# Patient Record
Sex: MALE | Race: WHITE | NOT HISPANIC OR LATINO | Employment: UNEMPLOYED | URBAN - NONMETROPOLITAN AREA
[De-identification: names, ages, dates, MRNs, and addresses within clinical notes are randomized per-mention and may not be internally consistent; named-entity substitution may affect disease eponyms.]

---

## 2017-03-24 ENCOUNTER — HISTORY (OUTPATIENT)
Dept: FAMILY MEDICINE | Facility: OTHER | Age: 42
End: 2017-03-24

## 2017-03-24 ENCOUNTER — HOSPITAL ENCOUNTER (OUTPATIENT)
Dept: RADIOLOGY | Facility: OTHER | Age: 42
End: 2017-03-24
Attending: NURSE PRACTITIONER

## 2017-03-24 ENCOUNTER — OFFICE VISIT - GICH (OUTPATIENT)
Dept: FAMILY MEDICINE | Facility: OTHER | Age: 42
End: 2017-03-24

## 2017-03-24 DIAGNOSIS — S39.011A STRAIN OF MUSCLE, FASCIA AND TENDON OF ABDOMEN, INITIAL ENCOUNTER: ICD-10-CM

## 2017-03-24 DIAGNOSIS — R10.32 LEFT LOWER QUADRANT PAIN: ICD-10-CM

## 2017-03-24 LAB
ABSOLUTE BASOPHILS - HISTORICAL: 0.1 THOU/CU MM
ABSOLUTE EOSINOPHILS - HISTORICAL: 0.3 THOU/CU MM
ABSOLUTE LYMPHOCYTES - HISTORICAL: 3 THOU/CU MM (ref 0.9–2.9)
ABSOLUTE MONOCYTES - HISTORICAL: 0.3 THOU/CU MM
ABSOLUTE NEUTROPHILS - HISTORICAL: 3.5 THOU/CU MM (ref 1.7–7)
ANION GAP - HISTORICAL: 6 (ref 5–18)
BASOPHILS # BLD AUTO: 1 %
BILIRUB UR QL: NEGATIVE
BUN SERPL-MCNC: 16 MG/DL (ref 7–25)
BUN/CREAT RATIO - HISTORICAL: 16
C-REACTIVE PROTEIN - HISTORICAL: <1 MG/DL
CALCIUM SERPL-MCNC: 9.2 MG/DL (ref 8.6–10.3)
CHLORIDE SERPLBLD-SCNC: 107 MMOL/L (ref 98–107)
CLARITY, URINE: CLEAR CLARITY
CO2 SERPL-SCNC: 26 MMOL/L (ref 21–31)
COLOR UR: YELLOW COLOR
CREAT SERPL-MCNC: 1 MG/DL (ref 0.7–1.3)
EOSINOPHIL NFR BLD AUTO: 4.5 %
ERYTHROCYTE [DISTWIDTH] IN BLOOD BY AUTOMATED COUNT: 11.7 % (ref 11.5–15.5)
GFR IF NOT AFRICAN AMERICAN - HISTORICAL: >60 ML/MIN/1.73M2
GLUCOSE SERPL-MCNC: 106 MG/DL (ref 70–105)
GLUCOSE URINE: NEGATIVE MG/DL
HCT VFR BLD AUTO: 48.3 % (ref 37–53)
HEMOGLOBIN: 16.5 G/DL (ref 13.5–17.5)
KETONES UR QL: NEGATIVE MG/DL
LEUKOCYTE ESTERASE URINE: NEGATIVE
LYMPHOCYTES NFR BLD AUTO: 41.6 % (ref 20–44)
MCH RBC QN AUTO: 29.4 PG (ref 26–34)
MCHC RBC AUTO-ENTMCNC: 34.1 G/DL (ref 32–36)
MCV RBC AUTO: 86 FL (ref 80–100)
MONOCYTES NFR BLD AUTO: 4.6 %
NEUTROPHILS NFR BLD AUTO: 48.2 % (ref 42–72)
NITRITE UR QL STRIP: NEGATIVE
OCCULT BLOOD,URINE - HISTORICAL: NEGATIVE
PH UR: 6 [PH]
PLATELET # BLD AUTO: 255 THOU/CU MM (ref 140–440)
PMV BLD: 8.1 FL (ref 6.5–11)
POTASSIUM SERPL-SCNC: 4.7 MMOL/L (ref 3.5–5.1)
PROTEIN QUALITATIVE,URINE - HISTORICAL: NEGATIVE MG/DL
RED BLOOD COUNT - HISTORICAL: 5.61 MIL/CU MM (ref 4.3–5.9)
SODIUM SERPL-SCNC: 139 MMOL/L (ref 133–143)
SP GR UR STRIP: 1.02
UROBILINOGEN,QUALITATIVE - HISTORICAL: NORMAL EU/DL
WHITE BLOOD COUNT - HISTORICAL: 7.2 THOU/CU MM (ref 4.5–11)

## 2018-01-04 NOTE — PATIENT INSTRUCTIONS
Patient Information     Patient Name MRN Brayan Schreiber [id number] Male 1975      Patient Instructions by Clari Benavides NP at 3/24/2017  9:00 AM     Author:  Clari Benavides NP Service:  (none) Author Type:  PHYS- Nurse Practitioner     Filed:  3/24/2017  9:49 AM Encounter Date:  3/24/2017 Status:  Signed     :  Clari Benavides NP (PHYS- Nurse Practitioner)            Abdominal Pain   ________________________________________________________________________  KEY POINTS    Abdominal pain is aching or cramping in your belly. The pain can be mild or intense, and sharp or dull.    Treatment may include medicine for gas and bloating, pain, or infection, or in some cases surgery.    Ask your provider how to take care of yourself at home, what symptoms to watch for, and what to do if you have them.  ________________________________________________________________________  What is abdominal pain?   Abdominal pain is aching or cramping in your belly. The abdomen, or belly, is the area between your chest and pelvis. The pain can range from mild discomfort to cramping or severe pain. It could also be sharp and stabbing or dull. It may feel like pressure or burning.  Abdominal pain is not normal, but often it may not be serious.  What is the cause?  Many things can cause abdominal pain, such as:    Ulcers, indigestion or heartburn    Infections, such as food poisoning or stomach flu    Food allergy or trouble digesting milk products (lactose intolerance) or gluten    Constipation, diarrhea, or gastritis (stomach irritation)    Hernia    Urinary tract infection or kidney stones    Muscle strain    Appendicitis  Women may have pain caused by menstruation, pregnancy, cramping of the uterus during childbirth or breast-feeding, or a disease or infection in the uterus  Sometimes abdominal pain is caused by a problem in another part of the body, such as the lungs or the heart. For example, a heart  attack can cause upper abdominal pain.  You cannot always tell where the problem is or how serious the problem is from the severity of the pain. Mild conditions such as gas or stomach flu may cause severe pain, while more serious problems such as appendicitis or cancer may cause only mild pain at first.  If your abdominal pain is severe or you also have any of the following symptoms, contact your healthcare provider as soon as possible:    Nausea, fever, or not able to keep food down for several days    Blood in your bowel movements    Trouble breathing    Vomiting blood    You are pregnant    The abdomen is sore to the touch    Pain is after an injury to the abdomen in the previous days    Pain lasts for several days  How is it diagnosed?  Your healthcare provider will ask about your symptoms and medical history and examine you. Be sure to tell your provider:    When you first felt the pain    Where it hurts    When it hurts    How long it hurts    Anything that makes it hurt such as eating or walking    Anything that makes it better such as avoiding certain foods or resting  Tests may include:    Blood tests    Urine tests    Chest X-ray    CT scan, which uses X-rays and a computer to show detailed pictures of the structures inside the body    Ultrasound, which uses sound waves to show pictures of structures inside the body    Endoscopy, which uses a slim, flexible, lighted tube and tiny camera passed through your mouth to look at your esophagus and stomach    Colonoscopy, which uses a thin, flexible, lighted tube and tiny camera passed through your rectum to look at your large intestine  How is it treated?  Your treatment will depend on what is causing the pain. Your provider may prescribe medicine to:    Treat pain    Treat or prevent an infection    Relieve gas and bloating    Reduce the acid in your stomach  Your healthcare provider may advise surgery for some problems, such as to remove an infected  appendix.  How can I help take care of myself?  Here are some things you can do that might help you feel better, after your healthcare provider has made sure that you do not need other treatments.    Take a warm bath.    Sip water or other clear liquids.    If you also have gas and bloating, it may be relieved with nonprescription medicine that contains simethicone.    Antacids may help to relieve indigestion, heartburn, and nausea.    Nonprescription pain relievers such as ibuprofen or acetaminophen can help relieve menstrual or muscle pain, but aspirin and ibuprofen can make an upset stomach worse. Read the label and take as directed. Unless recommended by your healthcare provider, you should not take these medicines for more than 10 days.    Nonsteroidal anti-inflammatory medicines (NSAIDs), such as ibuprofen, naproxen, and aspirin, may cause stomach bleeding and other problems. These risks increase with age.    Acetaminophen may cause liver damage or other problems. Unless recommended by your provider, don't take more than 3000 milligrams (mg) in 24 hours. To make sure you don t take too much, check other medicines you take to see if they also contain acetaminophen. Ask your provider if you need to avoid drinking alcohol while taking this medicine.  If you have seen your healthcare provider about the pain, follow their instructions. Ask your provider:    How and when you will get your test results    How long it will take to recover    If there are activities you should avoid and when you can return to your normal activities    How to take care of yourself at home    What symptoms or problems you should watch for and what to do if you have them  Make sure you know when you should come back for a checkup.  Call 911 right away if you have abdominal pain along with jaw, arm, shoulder, chest, or back pain, sweating, nausea, shortness of breath, or anxiety. These symptoms may mean you are having a heart attack.

## 2018-01-04 NOTE — PROGRESS NOTES
"Patient Information     Patient Name MRN Sex Brayan Hay [id number] Male 1975      Progress Notes by Clari Benavides NP at 3/24/2017  9:00 AM     Author:  Clari Benavides NP Service:  (none) Author Type:  PHYS- Nurse Practitioner     Filed:  3/24/2017 10:12 AM Encounter Date:  3/24/2017 Status:  Signed     :  Clari Benavides NP (PHYS- Nurse Practitioner)            Nursing Notes:   Still, Julia  3/24/2017  9:05 AM  Signed  Patient says has had abdominal pain since Tuesday . Gets worse as the day goes on.  Julia Still LPN ....................3/24/2017  8:56 AM    SUBJECTIVE:    Brayan Ventura is a 41 y.o. male who presents for abd pain for 3 days    Abdominal Pain/problem   This is a new problem. Episode onset: 3 days ago. The onset quality is gradual. The problem occurs constantly. The most recent episode lasted 3 days. The problem has been gradually worsening. The pain is located in the LLQ. The pain is at a severity of 6/10. The quality of the pain is aching and cramping. The abdominal pain radiates to the LLQ. The pain is aggravated by certain positions, movement and coughing (Laughing). The pain is relieved by certain positions (laying down but as soon as he stand up again the pain is back). Treatments tried: NSAIDs. Prior workup: none. There is no history of abdominal surgery, colon cancer, Crohn's disease, gallstones, GERD, irritable bowel syndrome, pancreatitis, PUD or ulcerative colitis.       No current outpatient prescriptions on file prior to visit.     No current facility-administered medications on file prior to visit.        REVIEW OF SYSTEMS:  Review of Systems   Gastrointestinal: Positive for abdominal pain.       OBJECTIVE:  /80  Pulse 76  Temp 97.5  F (36.4  C) (Temporal)  Ht 1.854 m (6' 1\")  Wt 115.2 kg (254 lb)  BMI 33.51 kg/m2    EXAM:   Physical Exam   Constitutional: He is well-developed, well-nourished, and in no distress.   HENT:   Head: Normocephalic " and atraumatic.   Right Ear: Tympanic membrane and ear canal normal.   Left Ear: Tympanic membrane and ear canal normal.   Nose: Nose normal.   Mouth/Throat: Uvula is midline, oropharynx is clear and moist and mucous membranes are normal.   Eyes: Conjunctivae are normal.   Neck: Neck supple.   Cardiovascular: Normal rate, regular rhythm and normal heart sounds.    Pulmonary/Chest: Effort normal and breath sounds normal. No respiratory distress. He has no wheezes. He has no rales.   Abdominal: Soft. Bowel sounds are normal. There is no hepatosplenomegaly. There is tenderness in the suprapubic area and left lower quadrant. There is rebound. There is no rigidity, no guarding and no CVA tenderness.   Lymphadenopathy:     He has no cervical adenopathy.   Nursing note and vitals reviewed.    Results for orders placed or performed in visit on 03/24/17      BASIC METABOLIC PANEL      Result  Value Ref Range    SODIUM 139 133 - 143 mmol/L    POTASSIUM 4.7 3.5 - 5.1 mmol/L    CHLORIDE 107 98 - 107 mmol/L    CO2,TOTAL 26 21 - 31 mmol/L    ANION GAP 6 5 - 18                    GLUCOSE 106 (H) 70 - 105 mg/dL    CALCIUM 9.2 8.6 - 10.3 mg/dL    BUN 16 7 - 25 mg/dL    CREATININE 1.00 0.70 - 1.30 mg/dL    BUN/CREAT RATIO           16                    GFR if African American >60 >60 ml/min/1.73m2    GFR if not African American >60 >60 ml/min/1.73m2   C-REACTIVE PROTEIN      Result  Value Ref Range    C-REACTIVE PROTEIN <1.000 <1.000 mg/dL   CBC WITH AUTO DIFFERENTIAL      Result  Value Ref Range    WHITE BLOOD COUNT         7.2 4.5 - 11.0 thou/cu mm    RED BLOOD COUNT           5.61 4.30 - 5.90 mil/cu mm    HEMOGLOBIN                16.5 13.5 - 17.5 g/dL    HEMATOCRIT                48.3 37.0 - 53.0 %    MCV                       86 80 - 100 fL    MCH                       29.4 26.0 - 34.0 pg    MCHC                      34.1 32.0 - 36.0 g/dL    RDW                       11.7 11.5 - 15.5 %    PLATELET COUNT            255 140 - 440  thou/cu mm    MPV                       8.1 6.5 - 11.0 fL    NEUTROPHILS               48.2 42.0 - 72.0 %    LYMPHOCYTES               41.6 20.0 - 44.0 %    MONOCYTES                 4.6 <12.0 %    EOSINOPHILS               4.5 <8.0 %    BASOPHILS                 1.0 <3.0 %    ABSOLUTE NEUTROPHILS      3.5 1.7 - 7.0 thou/cu mm    ABSOLUTE LYMPHOCYTES      3.0 (H) 0.9 - 2.9 thou/cu mm    ABSOLUTE MONOCYTES        0.3 <0.9 thou/cu mm    ABSOLUTE EOSINOPHILS      0.3 <0.5 thou/cu mm    ABSOLUTE BASOPHILS        0.1 <0.3 thou/cu mm   URINALYSIS W REFLEX MICROSCOPIC IF POSITIVE      Result  Value Ref Range    COLOR                     Yellow Yellow Color    CLARITY                   Clear Clear Clarity    SPECIFIC GRAVITY,URINE    1.025 1.010, 1.015, 1.020, 1.025                    PH,URINE                  6.0 6.0, 7.0, 8.0, 5.5, 6.5, 7.5, 8.5                    UROBILINOGEN,QUALITATIVE  Normal Normal EU/dl    PROTEIN, URINE Negative Negative mg/dL    GLUCOSE, URINE Negative Negative mg/dL    KETONES,URINE             Negative Negative mg/dL    BILIRUBIN,URINE           Negative Negative                    OCCULT BLOOD,URINE        Negative Negative                    NITRITE                   Negative Negative                    LEUKOCYTE ESTERASE        Negative Negative                     Completed Flat and Upright xray.  I personally reviewed the xray. There was a mild amount of stool, no stones, no obstructions upon initial read of xray.  Final read pending by radiology.    ASSESSMENT/PLAN:    ICD-10-CM    1. Left lower quadrant pain R10.32 CBC AND DIFFERENTIAL      BASIC METABOLIC PANEL      C-REACTIVE PROTEIN      URINALYSIS W REFLEX MICROSCOPIC IF POSITIVE      XR ABDOMEN 2 VIEW FLAT AND UPRIGHT OR DECUBITUS      CBC AND DIFFERENTIAL      BASIC METABOLIC PANEL      C-REACTIVE PROTEIN      CBC WITH AUTO DIFFERENTIAL      URINALYSIS W REFLEX MICROSCOPIC IF POSITIVE   2. Abdominal muscle strain, initial encounter  S39.011A         Plan:  Completed labs at today's visit CBC, CRP, BMP.  I personally reviewed the labs with the patient/parent at the visit. Abnormalities include WNL. Likely a muscle strain. Heat, NSAIDs and F/U if worsens.      I explained my diagnostic considerations and recommendations to the patient, who voiced understanding and agreement with the treatment plan. All questions were answered. We discussed potential side effects of any prescribed or recommended therapies, as well as expectations for response to treatments. He was advised to contact our office if there is no improvement or worsening of conditions or symptoms.  If s/s worsen or persist, patient will either come back or follow up with PCP.         POWER HOFFMANN NP ....................  3/24/2017   10:12 AM

## 2018-01-27 VITALS
TEMPERATURE: 97.5 F | SYSTOLIC BLOOD PRESSURE: 138 MMHG | HEART RATE: 76 BPM | WEIGHT: 254 LBS | DIASTOLIC BLOOD PRESSURE: 80 MMHG | HEIGHT: 73 IN | BODY MASS INDEX: 33.66 KG/M2

## 2018-03-05 ENCOUNTER — DOCUMENTATION ONLY (OUTPATIENT)
Dept: FAMILY MEDICINE | Facility: OTHER | Age: 43
End: 2018-03-05

## 2018-05-20 NOTE — NURSING NOTE
Patient Information     Patient Name MRN Sex Brayan Hay [id number] Male 1975      Nursing Note by Julia Paige at 3/24/2017  9:00 AM     Author:  Julia Paige Service:  (none) Author Type:  (none)     Filed:  3/24/2017  9:05 AM Encounter Date:  3/24/2017 Status:  Signed     :  Julia Paige            Patient says has had abdominal pain since Tuesday . Gets worse as the day goes on.  Julia Paige LPN ....................3/24/2017  8:56 AM           Pt has bed alarm in place - he did not use the call bell and attempted to get out of bed to sit in chair by himself - found him in the process of doing this and assisted him into a chair- very weak

## 2023-06-01 ENCOUNTER — APPOINTMENT (OUTPATIENT)
Dept: OCCUPATIONAL MEDICINE | Facility: OTHER | Age: 48
End: 2023-06-01

## 2023-06-01 ENCOUNTER — APPOINTMENT (OUTPATIENT)
Dept: CHIROPRACTIC MEDICINE | Facility: OTHER | Age: 48
End: 2023-06-01

## 2023-06-01 ENCOUNTER — OFFICE VISIT (OUTPATIENT)
Dept: FAMILY MEDICINE | Facility: OTHER | Age: 48
End: 2023-06-01

## 2023-06-01 DIAGNOSIS — D22.9 MULTIPLE ATYPICAL NEVI: Primary | ICD-10-CM

## 2023-06-01 DIAGNOSIS — Z02.1 PRE-EMPLOYMENT EXAMINATION: ICD-10-CM

## 2023-06-01 PROCEDURE — 99499 UNLISTED E&M SERVICE: CPT

## 2023-06-01 PROCEDURE — 99199 UNLISTED SPECIAL SVC PX/RPRT: CPT

## 2023-06-01 PROCEDURE — 99080 SPECIAL REPORTS OR FORMS: CPT

## 2023-06-01 PROCEDURE — 94010 BREATHING CAPACITY TEST: CPT

## 2023-08-01 ENCOUNTER — OFFICE VISIT (OUTPATIENT)
Dept: DERMATOLOGY | Facility: OTHER | Age: 48
End: 2023-08-01
Attending: FAMILY MEDICINE

## 2023-08-01 VITALS
TEMPERATURE: 97.7 F | RESPIRATION RATE: 18 BRPM | OXYGEN SATURATION: 95 % | SYSTOLIC BLOOD PRESSURE: 125 MMHG | BODY MASS INDEX: 33.66 KG/M2 | HEIGHT: 73 IN | WEIGHT: 254 LBS | HEART RATE: 54 BPM | DIASTOLIC BLOOD PRESSURE: 81 MMHG

## 2023-08-01 DIAGNOSIS — D22.9 MULTIPLE ATYPICAL NEVI: ICD-10-CM

## 2023-08-01 PROCEDURE — 99202 OFFICE O/P NEW SF 15 MIN: CPT | Performed by: DERMATOLOGY

## 2023-08-01 ASSESSMENT — PAIN SCALES - GENERAL: PAINLEVEL: NO PAIN (0)

## 2023-08-01 NOTE — PROGRESS NOTES
Visit Date: 2023    SUBJECTIVE: Darian  comes in for the first time.  He was advised to have his lesions checked by a dermatologist..  I am happy to do that.  He does have numerous moles on his back or lesions on his back.    OBJECTIVE:  Exam shows a  male, vigorously healthy and athletic-appearing.  On his back are dozens of pigmented nevi , and 4 stand out as slightly darker than the others and in the range of 5-6 mm in general diameter.      I checked these with the dermatoscope, and none showed marked atypicality that would suggest dysplasia or severe atypicality.  Therefore, I did not recommend any biopsies at this time.     PLAN:  I recommend that his wife look at his back,  every three to four months and see if any lesions exceed roughly 1/4 inch in diameter. .  If there are any that approach 1 cm,or darken with black,  I would like to see him again.  Otherwise, I think he can return to me p.r.n.        Medications and allergies were reviewed.  He can return p.r.n.    H Shahriar Rodriguez MD        D: 2023   T: 2023   MT: dale    Name:     TALIA HUNTER  MRN:      [id number]        Account:    [id number]   :      1975           Visit Date: 2023     Document: [id number]

## 2023-08-01 NOTE — LETTER
2023       RE: Talia Hunter  [address]  [facility] [address]     Dear Colleague,    Thank you for referring your patient, Talia Hunter, to the [facility]. Please see a copy of my visit note below.    Visit Date: 2023    SUBJECTIVE: Darian  comes in for the first time.  He was advised to have his lesions checked by a dermatologist..  I am happy to do that.  He does have numerous moles on his back or lesions on his back.    OBJECTIVE:  Exam shows a  male, vigorously healthy and athletic-appearing.  On his back are dozens of pigmented nevi , and 4 stand out as slightly darker than the others and in the range of 5-6 mm in general diameter.      I checked these with the dermatoscope, and none showed marked atypicality that would suggest dysplasia or severe atypicality.  Therefore, I did not recommend any biopsies at this time.     PLAN:  I recommend that his wife look at his back,  every three to four months and see if any lesions exceed roughly 1/4 inch in diameter. .  If there are any that approach 1 cm,or darken with black,  I would like to see him again.  Otherwise, I think he can return to me p.r.n.        Medications and allergies were reviewed.  He can return p.r.n.    PIERCE Rodriguez MD        D: 2023   T: 2023   MT: dale    Name:     TALIA HUNTER  MRN:      [id number]        Account:    [id number]   :      1975           Visit Date: 2023     Document: [id number]      Again, thank you for allowing me to participate in the care of your patient.      Sincerely,    PIERCE Rodriguez MD